# Patient Record
Sex: FEMALE | Race: OTHER | HISPANIC OR LATINO | URBAN - METROPOLITAN AREA
[De-identification: names, ages, dates, MRNs, and addresses within clinical notes are randomized per-mention and may not be internally consistent; named-entity substitution may affect disease eponyms.]

---

## 2021-05-18 ENCOUNTER — EMERGENCY (EMERGENCY)
Facility: HOSPITAL | Age: 46
LOS: 1 days | Discharge: HOME | End: 2021-05-18
Attending: EMERGENCY MEDICINE
Payer: MEDICARE

## 2021-05-18 VITALS
SYSTOLIC BLOOD PRESSURE: 105 MMHG | HEART RATE: 95 BPM | RESPIRATION RATE: 20 BRPM | HEIGHT: 65 IN | OXYGEN SATURATION: 100 % | DIASTOLIC BLOOD PRESSURE: 70 MMHG | TEMPERATURE: 98 F | WEIGHT: 126.1 LBS

## 2021-05-18 LAB — SARS-COV-2 RNA SPEC QL NAA+PROBE: SIGNIFICANT CHANGE UP

## 2021-05-18 PROCEDURE — U0005: CPT

## 2021-05-18 PROCEDURE — U0003: CPT

## 2021-05-18 PROCEDURE — 99283 EMERGENCY DEPT VISIT LOW MDM: CPT

## 2021-05-18 PROCEDURE — 99284 EMERGENCY DEPT VISIT MOD MDM: CPT

## 2021-05-18 RX ORDER — ALBUTEROL 90 UG/1
2 AEROSOL, METERED ORAL
Qty: 1 | Refills: 0
Start: 2021-05-18 | End: 2021-06-16

## 2021-05-18 NOTE — ED STATDOCS - OBJECTIVE STATEMENT
45 y/o female with PMHx of asthma c/o asthma exacerbation. PT hasn't had attack in 4 years. PT feels she has  dry cough. Pt used nebulizer last night to some relief. PT feels weak, unable to breath well, dry cough. PT denies fever. PT endorses body aches. Pt has both doses of covid vaccine finished 5/5. Pt endorses watery eyes.    : Rossi  [phone]

## 2021-05-18 NOTE — ED STATDOCS - CLINICAL SUMMARY MEDICAL DECISION MAKING FREE TEXT BOX
Pt with hx asthma with cough, congestion, reports swollen eyes. Pt has no wheezing  and is respiratory stable. Likely URI/allergies, although vaccinated will swab for covid.

## 2021-05-18 NOTE — ED STATDOCS - PATIENT PORTAL LINK FT
You can access the FollowMyHealth Patient Portal offered by [facility] by registering at the following website: [email or web address]. By joining [employer]’s FollowMyHealth portal, you will also be able to view your health information using other applications (apps) compatible with our system.

## 2021-05-18 NOTE — ED ADULT TRIAGE NOTE - PAIN RATING/NUMBER SCALE (0-10): REST
4
I have personally evaluated and examined the patient. The Attending was available to me as a supervising provider if needed.

## 2021-05-18 NOTE — ED ADULT TRIAGE NOTE - CHIEF COMPLAINT QUOTE
Pt arrives to ED c/o asthma execration since yesterday , not relieved with inhalers at home , breathing easy and unlbored

## 2022-04-01 PROBLEM — J45.909 UNSPECIFIED ASTHMA, UNCOMPLICATED: Chronic | Status: ACTIVE | Noted: 2021-05-18

## 2022-05-16 PROBLEM — Z00.00 ENCOUNTER FOR PREVENTIVE HEALTH EXAMINATION: Status: ACTIVE | Noted: 2022-05-16

## 2022-05-17 ENCOUNTER — APPOINTMENT (OUTPATIENT)
Dept: OBGYN | Facility: CLINIC | Age: 47
End: 2022-05-17
Payer: COMMERCIAL

## 2022-05-17 ENCOUNTER — NON-APPOINTMENT (OUTPATIENT)
Age: 47
End: 2022-05-17

## 2022-05-17 ENCOUNTER — LABORATORY RESULT (OUTPATIENT)
Age: 47
End: 2022-05-17

## 2022-05-17 VITALS
HEIGHT: 64 IN | WEIGHT: 122.4 LBS | BODY MASS INDEX: 20.9 KG/M2 | DIASTOLIC BLOOD PRESSURE: 60 MMHG | SYSTOLIC BLOOD PRESSURE: 100 MMHG

## 2022-05-17 DIAGNOSIS — Z01.419 ENCOUNTER FOR GYNECOLOGICAL EXAMINATION (GENERAL) (ROUTINE) W/OUT ABNORMAL FINDINGS: ICD-10-CM

## 2022-05-17 DIAGNOSIS — Z82.49 FAMILY HISTORY OF ISCHEMIC HEART DISEASE AND OTHER DISEASES OF THE CIRCULATORY SYSTEM: ICD-10-CM

## 2022-05-17 DIAGNOSIS — Z78.9 OTHER SPECIFIED HEALTH STATUS: ICD-10-CM

## 2022-05-17 DIAGNOSIS — Z12.31 ENCOUNTER FOR SCREENING MAMMOGRAM FOR MALIGNANT NEOPLASM OF BREAST: ICD-10-CM

## 2022-05-17 DIAGNOSIS — Z83.3 FAMILY HISTORY OF DIABETES MELLITUS: ICD-10-CM

## 2022-05-17 DIAGNOSIS — Z12.11 ENCOUNTER FOR SCREENING FOR MALIGNANT NEOPLASM OF COLON: ICD-10-CM

## 2022-05-17 DIAGNOSIS — Z80.3 FAMILY HISTORY OF MALIGNANT NEOPLASM OF BREAST: ICD-10-CM

## 2022-05-17 PROCEDURE — 99386 PREV VISIT NEW AGE 40-64: CPT

## 2022-05-17 PROCEDURE — 82270 OCCULT BLOOD FECES: CPT

## 2022-05-17 RX ORDER — MULTIVITAMIN
TABLET ORAL
Refills: 0 | Status: ACTIVE | COMMUNITY

## 2022-05-18 LAB
DATE COLLECTED: NORMAL
HEMOCCULT SP1 STL QL: NEGATIVE
QUALITY CONTROL: YES

## 2022-05-20 PROBLEM — Z80.3 FAMILY HISTORY OF MALIGNANT NEOPLASM OF BREAST: Status: ACTIVE | Noted: 2022-05-17

## 2022-05-20 PROBLEM — Z82.49 FAMILY HISTORY OF HYPERTENSION: Status: ACTIVE | Noted: 2022-05-17

## 2022-05-20 PROBLEM — Z78.9 SOCIAL ALCOHOL USE: Status: ACTIVE | Noted: 2022-05-17

## 2022-05-20 PROBLEM — Z83.3 FAMILY HISTORY OF DIABETES MELLITUS: Status: ACTIVE | Noted: 2022-05-17

## 2022-05-20 PROBLEM — Z78.9 NON-SMOKER: Status: ACTIVE | Noted: 2022-05-17

## 2022-05-20 LAB
CYTOLOGY CVX/VAG DOC THIN PREP: ABNORMAL
HPV HIGH+LOW RISK DNA PNL CVX: DETECTED

## 2022-05-20 NOTE — END OF VISIT
[FreeTextEntry3] : I, Yamileth Mcgarry solely acted as a scribe on behalf of  on 05/17/2022. All medical entries made by the scribe were at my, Dr. Robertson, direction and personally dictated by me on 05/17/2022 . I have reviewed the chart and agree that the record accurately reflects my personal performance of the history, physical exam, assessment and plan. I have also personally directed, reviewed and agreed with the chart.\par

## 2022-05-20 NOTE — HISTORY OF PRESENT ILLNESS
[N] : Patient does not use contraception [Y] : Positive pregnancy history [Menarche Age: ____] : age at menarche was [unfilled] [No] : Patient does not have concerns regarding sex [Currently Active] : currently active [TextBox_4] : : [place], [place] [id number] [Mammogramdate] : n/a [TextBox_19] : per pt [PapSmeardate] : 4 yrs ago [TextBox_31] : per pt [ColonoscopyDate] : n/a [TextBox_43] : per pt [LMPDate] : 04/20/22 [PGxTotal] : 1 [[facility] Center[facility]erm] : 1 [[facility] Center[facility]] : 1 [FreeTextEntry1] : 04/20/22

## 2022-05-20 NOTE — PHYSICAL EXAM
[Chaperone Present] : A chaperone was present in the examining room during all aspects of the physical examination [Appropriately responsive] : appropriately responsive [Alert] : alert [No Acute Distress] : no acute distress [Soft] : soft [Non-tender] : non-tender [Non-distended] : non-distended [Oriented x3] : oriented x3 [Examination Of The Breasts] : a normal appearance [No Discharge] : no discharge [No Masses] : no breast masses were palpable [Labia Majora] : normal [Labia Minora] : normal [Normal] : normal [Uterine Adnexae] : normal [No Tenderness] : no tenderness [Nl Sphincter Tone] : normal sphincter tone [FreeTextEntry1] : GEOVANNI Viveros  [FreeTextEntry2] : d [FreeTextEntry9] : Guaiac negative

## 2022-05-20 NOTE — DISCUSSION/SUMMARY
[FreeTextEntry1] : Pap done today\par STI testing offered and patient declines.\par Hormonal contraceptive methods were discussed and offered. Patient declines.\par Prescription for mammogram screening.\par Self-breast exam reviewed. \par Referral for colonoscopy given to patient. \par  used. \par She will follow up annually or as needed.\par \par All questions and concerns were discussed.

## 2022-09-14 ENCOUNTER — RESULT REVIEW (OUTPATIENT)
Age: 47
End: 2022-09-14

## 2022-09-14 ENCOUNTER — APPOINTMENT (OUTPATIENT)
Dept: MAMMOGRAPHY | Facility: CLINIC | Age: 47
End: 2022-09-14

## 2022-09-14 PROCEDURE — 77063 BREAST TOMOSYNTHESIS BI: CPT

## 2022-09-14 PROCEDURE — 77067 SCR MAMMO BI INCL CAD: CPT

## 2023-07-29 ENCOUNTER — EMERGENCY (EMERGENCY)
Facility: HOSPITAL | Age: 48
LOS: 1 days | Discharge: HOME | End: 2023-07-29
Attending: EMERGENCY MEDICINE
Payer: COMMERCIAL

## 2023-07-29 VITALS
TEMPERATURE: 98 F | SYSTOLIC BLOOD PRESSURE: 109 MMHG | RESPIRATION RATE: 18 BRPM | DIASTOLIC BLOOD PRESSURE: 82 MMHG | OXYGEN SATURATION: 100 % | HEART RATE: 73 BPM

## 2023-07-29 PROCEDURE — T1013: CPT

## 2023-07-29 PROCEDURE — 99284 EMERGENCY DEPT VISIT MOD MDM: CPT

## 2023-07-29 PROCEDURE — 96372 THER/PROPH/DIAG INJ SC/IM: CPT

## 2023-07-29 PROCEDURE — 99283 EMERGENCY DEPT VISIT LOW MDM: CPT

## 2023-07-29 RX ORDER — IBUPROFEN 200 MG
1 TABLET ORAL
Qty: 20 | Refills: 0
Start: 2023-07-29 | End: 2023-08-02

## 2023-07-29 RX ORDER — METHOCARBAMOL 500 MG/1
1 TABLET, FILM COATED ORAL
Qty: 9 | Refills: 0
Start: 2023-07-29 | End: 2023-07-31

## 2023-07-29 RX ORDER — KETOROLAC TROMETHAMINE 30 MG/ML
30 SYRINGE (ML) INJECTION ONCE
Refills: 0 | Status: DISCONTINUED | OUTPATIENT
Start: 2023-07-29 | End: 2023-07-29

## 2023-07-29 RX ORDER — METHOCARBAMOL 500 MG/1
750 TABLET, FILM COATED ORAL ONCE
Refills: 0 | Status: COMPLETED | OUTPATIENT
Start: 2023-07-29 | End: 2023-07-29

## 2023-07-29 RX ORDER — DEXAMETHASONE 0.5 MG/5ML
10 ELIXIR ORAL ONCE
Refills: 0 | Status: COMPLETED | OUTPATIENT
Start: 2023-07-29 | End: 2023-07-29

## 2023-07-29 RX ADMIN — Medication 30 MILLIGRAM(S): at 09:38

## 2023-07-29 RX ADMIN — METHOCARBAMOL 750 MILLIGRAM(S): 500 TABLET, FILM COATED ORAL at 09:42

## 2023-07-29 RX ADMIN — Medication 10 MILLIGRAM(S): at 09:38

## 2023-07-29 NOTE — ED ADULT TRIAGE NOTE - CHIEF COMPLAINT QUOTE
patient states the headboard of her bed fell on her back last night, now c/o back pain. denies bruising, lacerations or wounds to back. took advil last night with some relief.

## 2023-07-29 NOTE — ED PROVIDER NOTE - OBJECTIVE STATEMENT
48-year-old female presents to ED complaining of back pain x1 day.  Patient explained that while moving yesterday the headboard of the bed fell on her back.  Patient denies any head trauma, loss of consciousness or changes in vision.  Patient denies any shortness of breath or chest pain.  Patient also denies any significant past medical or surgical illness.  Patient states that she took some ibuprofen at home but if she were to relieve the pain.  Patient denies any other issue at this time.

## 2023-07-29 NOTE — ED PROVIDER NOTE - ATTENDING APP SHARED VISIT CONTRIBUTION OF CARE
I, Jose M Mojica, performed the initial face to face bedside interview with this patient regarding history of present illness, review of symptoms and relevant past medical, social and family history.  I completed an independent physical examination.  I was the initial provider who evaluated this patient. I have signed out the follow up of any pending tests (i.e. labs, radiological studies) to the ACP.  I have communicated the patient’s plan of care and disposition with the ACP.

## 2023-07-29 NOTE — ED ADULT NURSE NOTE - NSFALLUNIVINTERV_ED_ALL_ED
Bed/Stretcher in lowest position, wheels locked, appropriate side rails in place/Call bell, personal items and telephone in reach/Instruct patient to call for assistance before getting out of bed/chair/stretcher/Non-slip footwear applied when patient is off stretcher/[place] to call system/Physically safe environment - no spills, clutter or unnecessary equipment/Purposeful proactive rounding/Room/bathroom lighting operational, light cord in reach

## 2023-07-29 NOTE — ED PROVIDER NOTE - PATIENT PORTAL LINK FT
27.4
You can access the FollowMyHealth Patient Portal offered by [facility] by registering at the following website: [email or web address]. By joining [employer]’s FollowMyHealth portal, you will also be able to view your health information using other applications (apps) compatible with our system.

## 2023-07-29 NOTE — ED PROVIDER NOTE - PROGRESS NOTE DETAILS
Treated for pain in the ED and she feels a lot better.  Patient DC in stable condition with medications sent to her pharmacy.

## 2023-07-29 NOTE — ED ADULT NURSE NOTE - OBJECTIVE STATEMENT
pt a&o x4, RR even and unlabored, skin warm and dry. pt c/o back pain from shoulder to sacrum after a "headboard feel on her back" last night. pt medicated per EMR for pain.

## 2023-07-29 NOTE — ED PROVIDER NOTE - CLINICAL SUMMARY MEDICAL DECISION MAKING FREE TEXT BOX
48-year-old female presents to ED complaining of back pain x1 day.  Patient explained that while moving yesterday the headboard of the bed fell on her back.  Patient denies any head trauma, loss of consciousness or changes in vision.  Patient denies any shortness of breath or chest pain.  Patient also denies any significant past medical or surgical illness. HEENT: Normal findings, Eyes : PERRLA, EOMI , Nares clear and Throat : WNL  Lungs: Clear B/L with good air entry  CVS: S1-S2 , with no murmurs  Abd : Normal BS, with no tenderness or organomegaly  Back + tenderness on paraspinal muscles.   Ext: Normal findings

## 2023-07-29 NOTE — ED ADULT NURSE NOTE - NS ED NURSE RECORD ANOTHER HT AND WT
Problem: Falls - Risk of  Goal: *Absence of Falls  Description: Document Zelphiliz Geo Fall Risk and appropriate interventions in the flowsheet.   Outcome: Progressing Towards Goal  Note: Fall Risk Interventions:  Mobility Interventions: Bed/chair exit alarm         Medication Interventions: Bed/chair exit alarm    Elimination Interventions: Call light in reach Yes

## 2024-07-23 ENCOUNTER — TRANSCRIPTION ENCOUNTER (OUTPATIENT)
Age: 49
End: 2024-07-23